# Patient Record
Sex: FEMALE | Race: OTHER | Employment: PART TIME
[De-identification: names, ages, dates, MRNs, and addresses within clinical notes are randomized per-mention and may not be internally consistent; named-entity substitution may affect disease eponyms.]

---

## 2024-09-19 ENCOUNTER — HOSPITAL ENCOUNTER (OUTPATIENT)
Facility: HOSPITAL | Age: 39
Setting detail: SPECIMEN
Discharge: HOME | End: 2024-09-22

## 2024-09-19 DIAGNOSIS — R42 DIZZINESS: ICD-10-CM

## 2024-09-19 PROCEDURE — 84443 ASSAY THYROID STIM HORMONE: CPT

## 2024-09-19 PROCEDURE — 80061 LIPID PANEL: CPT

## 2024-09-19 PROCEDURE — 82607 VITAMIN B-12: CPT

## 2024-09-19 PROCEDURE — 80053 COMPREHEN METABOLIC PANEL: CPT

## 2024-09-19 PROCEDURE — 83036 HEMOGLOBIN GLYCOSYLATED A1C: CPT

## 2024-09-19 PROCEDURE — 85027 COMPLETE CBC AUTOMATED: CPT

## 2024-09-19 PROCEDURE — 82746 ASSAY OF FOLIC ACID SERUM: CPT

## 2024-09-20 LAB
ALBUMIN SERPL-MCNC: 4.2 G/DL (ref 3.5–5)
ALBUMIN/GLOB SERPL: 1.2 (ref 1.1–2.2)
ALP SERPL-CCNC: 116 U/L (ref 45–117)
ALT SERPL-CCNC: 46 U/L (ref 12–78)
ANION GAP SERPL CALC-SCNC: 7 MMOL/L (ref 2–12)
AST SERPL-CCNC: 22 U/L (ref 15–37)
BILIRUB SERPL-MCNC: 0.6 MG/DL (ref 0.2–1)
BUN SERPL-MCNC: 10 MG/DL (ref 6–20)
BUN/CREAT SERPL: 12 (ref 12–20)
CALCIUM SERPL-MCNC: 9.4 MG/DL (ref 8.5–10.1)
CHLORIDE SERPL-SCNC: 102 MMOL/L (ref 97–108)
CHOLEST SERPL-MCNC: 179 MG/DL
CO2 SERPL-SCNC: 28 MMOL/L (ref 21–32)
CREAT SERPL-MCNC: 0.83 MG/DL (ref 0.55–1.02)
ERYTHROCYTE [DISTWIDTH] IN BLOOD BY AUTOMATED COUNT: 13.1 % (ref 11.5–14.5)
EST. AVERAGE GLUCOSE BLD GHB EST-MCNC: 117 MG/DL
FOLATE SERPL-MCNC: 23.1 NG/ML (ref 5–21)
GLOBULIN SER CALC-MCNC: 3.6 G/DL (ref 2–4)
GLUCOSE SERPL-MCNC: 143 MG/DL (ref 65–100)
HBA1C MFR BLD: 5.7 % (ref 4–5.6)
HCT VFR BLD AUTO: 47.2 % (ref 35–47)
HDLC SERPL-MCNC: 41 MG/DL
HDLC SERPL: 4.4 (ref 0–5)
HGB BLD-MCNC: 15.5 G/DL (ref 11.5–16)
LDLC SERPL CALC-MCNC: 113.8 MG/DL (ref 0–100)
MCH RBC QN AUTO: 29.2 PG (ref 26–34)
MCHC RBC AUTO-ENTMCNC: 32.8 G/DL (ref 30–36.5)
MCV RBC AUTO: 89.1 FL (ref 80–99)
NRBC # BLD: 0 K/UL (ref 0–0.01)
NRBC BLD-RTO: 0 PER 100 WBC
PLATELET # BLD AUTO: 280 K/UL (ref 150–400)
PMV BLD AUTO: 11.9 FL (ref 8.9–12.9)
POTASSIUM SERPL-SCNC: 3.6 MMOL/L (ref 3.5–5.1)
PROT SERPL-MCNC: 7.8 G/DL (ref 6.4–8.2)
RBC # BLD AUTO: 5.3 M/UL (ref 3.8–5.2)
SODIUM SERPL-SCNC: 137 MMOL/L (ref 136–145)
TRIGL SERPL-MCNC: 121 MG/DL
TSH SERPL DL<=0.05 MIU/L-ACNC: 1.36 UIU/ML (ref 0.36–3.74)
VIT B12 SERPL-MCNC: 667 PG/ML (ref 193–986)
VLDLC SERPL CALC-MCNC: 24.2 MG/DL
WBC # BLD AUTO: 8.8 K/UL (ref 3.6–11)

## 2024-09-30 ENCOUNTER — TELEMEDICINE (OUTPATIENT)
Age: 39
End: 2024-09-30

## 2024-09-30 DIAGNOSIS — R11.0 NAUSEA: Primary | ICD-10-CM

## 2024-09-30 DIAGNOSIS — R73.03 PREDIABETES: ICD-10-CM

## 2024-09-30 PROCEDURE — 99441 PR PHYS/QHP TELEPHONE EVALUATION 5-10 MIN: CPT | Performed by: FAMILY MEDICINE

## 2024-09-30 RX ORDER — PANTOPRAZOLE SODIUM 40 MG/1
40 TABLET, DELAYED RELEASE ORAL EVERY EVENING
Qty: 30 TABLET | Refills: 0 | Status: SHIPPED | OUTPATIENT
Start: 2024-09-30

## 2024-09-30 SDOH — ECONOMIC STABILITY: INCOME INSECURITY: HOW HARD IS IT FOR YOU TO PAY FOR THE VERY BASICS LIKE FOOD, HOUSING, MEDICAL CARE, AND HEATING?: NOT VERY HARD

## 2024-09-30 SDOH — ECONOMIC STABILITY: FOOD INSECURITY: WITHIN THE PAST 12 MONTHS, THE FOOD YOU BOUGHT JUST DIDN'T LAST AND YOU DIDN'T HAVE MONEY TO GET MORE.: NEVER TRUE

## 2024-09-30 SDOH — ECONOMIC STABILITY: FOOD INSECURITY: WITHIN THE PAST 12 MONTHS, YOU WORRIED THAT YOUR FOOD WOULD RUN OUT BEFORE YOU GOT MONEY TO BUY MORE.: NEVER TRUE

## 2024-09-30 ASSESSMENT — PATIENT HEALTH QUESTIONNAIRE - PHQ9
SUM OF ALL RESPONSES TO PHQ QUESTIONS 1-9: 0
SUM OF ALL RESPONSES TO PHQ9 QUESTIONS 1 & 2: 0
1. LITTLE INTEREST OR PLEASURE IN DOING THINGS: NOT AT ALL
2. FEELING DOWN, DEPRESSED OR HOPELESS: NOT AT ALL
SUM OF ALL RESPONSES TO PHQ QUESTIONS 1-9: 0

## 2024-09-30 NOTE — PROGRESS NOTES
Discharge call to the pt. The pt verified her name and . I reviewed the medication ordered today with the pt. The pt confirmed her Pharmacy and that she received the coupon for the medication. Melida Krishnan RN    
Intake call to the pt. AMN Int # [id number]. The pt verified her name and .   Pt reports she is taking a medication she bought herself. Magnesium 1 capsule a day. She does not know the mg.  Coordination of Care  1. Have you been to the ER, urgent care clinic since your last visit?  Hospitalized since your last visit? No    2. Have you seen or consulted any other health care providers outside of the [facility] since your last visit?  Include any pap smears or colon screening. No  Does the patient need refills?No    Learning Assessment Complete? no  Depression Screening complete in the past 12 months? yes    
located at home and provider was located in office or at home.     An electronic signature was used to authenticate this note.  -- Víctor Thibodeaux MD

## 2024-10-15 ENCOUNTER — OFFICE VISIT (OUTPATIENT)
Age: 39
End: 2024-10-15

## 2024-10-15 ENCOUNTER — HOSPITAL ENCOUNTER (OUTPATIENT)
Facility: HOSPITAL | Age: 39
Setting detail: SPECIMEN
Discharge: HOME | End: 2024-10-18

## 2024-10-15 VITALS
SYSTOLIC BLOOD PRESSURE: 140 MMHG | OXYGEN SATURATION: 95 % | HEART RATE: 75 BPM | DIASTOLIC BLOOD PRESSURE: 92 MMHG | BODY MASS INDEX: 32.37 KG/M2 | TEMPERATURE: 97.8 F | WEIGHT: 185 LBS

## 2024-10-15 DIAGNOSIS — Z01.419 ENCOUNTER FOR WELL WOMAN EXAM: Primary | ICD-10-CM

## 2024-10-15 DIAGNOSIS — Z98.51 HISTORY OF BILATERAL TUBAL LIGATION: ICD-10-CM

## 2024-10-15 DIAGNOSIS — Z23 NEEDS FLU SHOT: ICD-10-CM

## 2024-10-15 DIAGNOSIS — R03.0 ELEVATED BLOOD PRESSURE READING: ICD-10-CM

## 2024-10-15 PROCEDURE — 90656 IIV3 VACC NO PRSV 0.5 ML IM: CPT | Performed by: PHYSICIAN ASSISTANT

## 2024-10-15 PROCEDURE — 90471 IMMUNIZATION ADMIN: CPT | Performed by: PHYSICIAN ASSISTANT

## 2024-10-15 PROCEDURE — 88175 CYTOPATH C/V AUTO FLUID REDO: CPT

## 2024-10-15 PROCEDURE — 87624 HPV HI-RISK TYP POOLED RSLT: CPT

## 2024-10-15 PROCEDURE — 99395 PREV VISIT EST AGE 18-39: CPT | Performed by: PHYSICIAN ASSISTANT

## 2024-10-15 SDOH — HEALTH STABILITY: PHYSICAL HEALTH: ON AVERAGE, HOW MANY MINUTES DO YOU ENGAGE IN EXERCISE AT THIS LEVEL?: 0 MIN

## 2024-10-15 SDOH — HEALTH STABILITY: PHYSICAL HEALTH: ON AVERAGE, HOW MANY DAYS PER WEEK DO YOU ENGAGE IN MODERATE TO STRENUOUS EXERCISE (LIKE A BRISK WALK)?: 0 DAYS

## 2024-10-15 ASSESSMENT — PATIENT HEALTH QUESTIONNAIRE - PHQ9
2. FEELING DOWN, DEPRESSED OR HOPELESS: NOT AT ALL
SUM OF ALL RESPONSES TO PHQ9 QUESTIONS 1 & 2: 0
SUM OF ALL RESPONSES TO PHQ QUESTIONS 1-9: 0
1. LITTLE INTEREST OR PLEASURE IN DOING THINGS: NOT AT ALL
SUM OF ALL RESPONSES TO PHQ QUESTIONS 1-9: 0

## 2024-10-15 NOTE — PROGRESS NOTES
[facility]    When was your last pap smear and where? 12/2019    Any abnormal results from previous pap smears? no    Any problems with your breasts? no    Any family history of breast/ovarian cancer? no    Do you have any kids? yes    Oldest 22,14 youngest 12    Are you sexually active? yes    Are you using any type of birth control? If yes where do you go for this? no    Abuse screening completed this visit? yes

## 2024-10-15 NOTE — PROGRESS NOTES
Assessment/Plan:    Anila Marrufo was seen today for gynecologic exam.    Diagnoses and all orders for this visit:    Encounter for well woman exam    History of bilateral tubal ligation    Elevated blood pressure reading    Needs flu shot    EWL info for next year  Lifestyle changes for elevated blood pressure- has f/up appt scheduled for 10/25/24 to recheck   She has not made any lifestyle changes since her last appt. We discussed how this could help her pre diabetes and elevated bp    No follow-up provider specified.    Breanne Sahu PA-C  Anila Marrufo Calderon Coleman expressed understanding of this plan. An AVS was printed and given to the patient.      ----------------------------------------------------------------------    Chief Complaint   Patient presents with    Gynecologic Exam       History of Present Illness:  Pt presents for well woman visit  Last pap , no hx of abnl   . Had BTL after last delivery  , no risk for DV  Having regular monthly periods, not heavy   Mom and sister have HTN       No past medical history on file.    Current Outpatient Medications   Medication Sig Dispense Refill    pantoprazole (PROTONIX) 40 MG tablet Take 1 tablet by mouth every evening 30 tablet 0     No current facility-administered medications for this visit.       No Known Allergies    Social History     Tobacco Use    Smoking status: Never     Passive exposure: Never    Smokeless tobacco: Never   Substance Use Topics    Alcohol use: Never    Drug use: Never       No family history on file.    Physical Exam:     BP (!) 140/92 (Site: Left Upper Arm, Position: Sitting, Cuff Size: Large Adult)   Pulse 75   Temp 97.8 °F (36.6 °C) (Temporal)   Wt 83.9 kg (185 lb)   LMP 2024   SpO2 95%   BMI 32.37 kg/m²     A&Ox3  WDWN NAD  Respirations normal and non labored  Cor RRR s1s2  Lungs CTA Chema   Thyroid not enlarged  Pelvic exam- ext neg for lesion or discharge. Cervix and vagina neg for

## 2024-10-15 NOTE — PROGRESS NOTES
Anila thurstonNiru Calderon Coleman seen at d/c, full name and  verified. After Visit Summary provided and all discharge instructions reviewed. Provided patient with information pamphlet and phone number for [facility] for pap smear and mammogram exams. Patient was instructed to call and set up an appointment at her earliest convenience. Patient is aware that the program has its own financial screening and requirements.     Influenza vaccine requested by patient. [facility] Immunization Information System (VIIS) historical immunizations and consent reviewed. VIS given to patient and possible side effects explained, including those which would warrant emergent evaluation. Patient denies fever, allergies, and previous immunization reactions. Advised patient to wait 15 minutes after vaccine administration to monitor for adverse reactions, patient verbalizes understanding. Immunization administered by this RN per order and recorded in EMR and VIIS. No adverse reactions noted at time of discharge. Opportunity for questions provided, patient denies any questions.     Shannon Skelton RN

## 2024-10-17 LAB — HPV I/H RISK 1 DNA CVX QL PROBE+SIG AMP: NEGATIVE

## 2024-10-25 ENCOUNTER — OFFICE VISIT (OUTPATIENT)
Age: 39
End: 2024-10-25

## 2024-10-25 VITALS
SYSTOLIC BLOOD PRESSURE: 119 MMHG | TEMPERATURE: 98 F | WEIGHT: 184.3 LBS | DIASTOLIC BLOOD PRESSURE: 68 MMHG | BODY MASS INDEX: 32.25 KG/M2 | HEART RATE: 82 BPM | OXYGEN SATURATION: 100 %

## 2024-10-25 DIAGNOSIS — R11.0 NAUSEA: Primary | ICD-10-CM

## 2024-10-25 DIAGNOSIS — R73.03 PREDIABETES: ICD-10-CM

## 2024-10-25 PROCEDURE — 99213 OFFICE O/P EST LOW 20 MIN: CPT | Performed by: FAMILY MEDICINE

## 2024-10-25 ASSESSMENT — PATIENT HEALTH QUESTIONNAIRE - PHQ9
SUM OF ALL RESPONSES TO PHQ9 QUESTIONS 1 & 2: 0
2. FEELING DOWN, DEPRESSED OR HOPELESS: NOT AT ALL
SUM OF ALL RESPONSES TO PHQ QUESTIONS 1-9: 0
1. LITTLE INTEREST OR PLEASURE IN DOING THINGS: NOT AT ALL
SUM OF ALL RESPONSES TO PHQ QUESTIONS 1-9: 0

## 2024-10-25 ASSESSMENT — ENCOUNTER SYMPTOMS
TROUBLE SWALLOWING: 0
NAUSEA: 0
ABDOMINAL PAIN: 0

## 2024-10-25 NOTE — PROGRESS NOTES
Pt's name and  verified. AVS provided. Pt instructed to schedule virtual visit in 6-8 weeks and in clinic follow up in 6 months per provider. Pt verbalizes understanding.  Time allowed for questions, all questions answered. Chloé North RN

## 2024-10-25 NOTE — PROGRESS NOTES
Anila Bone (: 1985) is a 39 y.o. female, Established patient, here for evaluation of the following chief complaint(s):  Follow-up (BP f/u )       ASSESSMENT/PLAN:  1. Nausea  Resolved on Protonix.  Finish Protonix and will do VV in 1-2 months to see if sx recur.  Will get H.Pylori Ag if sx recur.  2. Prediabetes  Discussed diet.  Recheck in 6 months.    Return for follow up F2F in 6 months for prediabetes, VV 6-8 weeks for follow up on nausea (PM appt).    SUBJECTIVE:  Follow up on BP, nausea  Nausea/Dizziness: 4 months of AM, nausea and dizziness.  Usually in AM but occasionally during the day.  Has improved with Protonix.  Elevated BP:  she is being more careful with diet.    Shx:  no smoking, no alcohol.  Fhx:  Sis - DM    PMHx:  BTL    Review of Systems   HENT:  Negative for trouble swallowing.    Gastrointestinal:  Negative for abdominal pain and nausea.     OBJECTIVE:  Blood pressure 119/68, pulse 82, temperature 98 °F (36.7 °C), temperature source Temporal, weight 83.6 kg (184 lb 4.8 oz), last menstrual period 10/19/2024, SpO2 100%.  Physical Exam  CONSTITUTIONAL:  Well developed.  No apparent distress.  PSYCHIATRIC: Oriented to time, place, person & situation.  Appropriate mood and affect.  ABDOMEN:  Normal bowel sounds.  Abdomen soft, non tender.  No hepatosplenomegaly or masses.    No results found for any visits on 10/25/24.       An electronic signature was used to authenticate this note.  -- Víctor Thibodeaux MD

## 2025-05-23 ENCOUNTER — HOSPITAL ENCOUNTER (OUTPATIENT)
Facility: HOSPITAL | Age: 40
Setting detail: SPECIMEN
Discharge: HOME | End: 2025-05-26

## 2025-05-23 ENCOUNTER — OFFICE VISIT (OUTPATIENT)
Age: 40
End: 2025-05-23

## 2025-05-23 VITALS
SYSTOLIC BLOOD PRESSURE: 135 MMHG | HEART RATE: 69 BPM | TEMPERATURE: 97.9 F | BODY MASS INDEX: 31.32 KG/M2 | OXYGEN SATURATION: 99 % | WEIGHT: 179 LBS | DIASTOLIC BLOOD PRESSURE: 78 MMHG

## 2025-05-23 DIAGNOSIS — M77.11 LATERAL EPICONDYLITIS OF RIGHT ELBOW: ICD-10-CM

## 2025-05-23 DIAGNOSIS — R73.03 PREDIABETES: ICD-10-CM

## 2025-05-23 DIAGNOSIS — G56.03 CARPAL TUNNEL SYNDROME ON BOTH SIDES: ICD-10-CM

## 2025-05-23 DIAGNOSIS — R73.03 PREDIABETES: Primary | ICD-10-CM

## 2025-05-23 DIAGNOSIS — R11.0 NAUSEA: ICD-10-CM

## 2025-05-23 LAB
ALBUMIN SERPL-MCNC: 4.2 G/DL (ref 3.5–5)
ALBUMIN/GLOB SERPL: 1.2 (ref 1.1–2.2)
ALP SERPL-CCNC: 102 U/L (ref 45–117)
ALT SERPL-CCNC: 30 U/L (ref 12–78)
ANION GAP SERPL CALC-SCNC: 6 MMOL/L (ref 2–12)
AST SERPL-CCNC: 15 U/L (ref 15–37)
BILIRUB SERPL-MCNC: 0.8 MG/DL (ref 0.2–1)
BUN SERPL-MCNC: 11 MG/DL (ref 6–20)
BUN/CREAT SERPL: 13 (ref 12–20)
CALCIUM SERPL-MCNC: 9.2 MG/DL (ref 8.5–10.1)
CHLORIDE SERPL-SCNC: 104 MMOL/L (ref 97–108)
CO2 SERPL-SCNC: 27 MMOL/L (ref 21–32)
CREAT SERPL-MCNC: 0.84 MG/DL (ref 0.55–1.02)
GLOBULIN SER CALC-MCNC: 3.4 G/DL (ref 2–4)
GLUCOSE SERPL-MCNC: 102 MG/DL (ref 65–100)
POTASSIUM SERPL-SCNC: 3.9 MMOL/L (ref 3.5–5.1)
PROT SERPL-MCNC: 7.6 G/DL (ref 6.4–8.2)
SODIUM SERPL-SCNC: 137 MMOL/L (ref 136–145)

## 2025-05-23 PROCEDURE — 80053 COMPREHEN METABOLIC PANEL: CPT

## 2025-05-23 PROCEDURE — 99214 OFFICE O/P EST MOD 30 MIN: CPT | Performed by: FAMILY MEDICINE

## 2025-05-23 PROCEDURE — 83036 HEMOGLOBIN GLYCOSYLATED A1C: CPT

## 2025-05-23 PROCEDURE — 82306 VITAMIN D 25 HYDROXY: CPT

## 2025-05-23 SDOH — HEALTH STABILITY: MENTAL HEALTH: HOW MANY DRINKS CONTAINING ALCOHOL DO YOU HAVE ON A TYPICAL DAY WHEN YOU ARE DRINKING?: PATIENT DOES NOT DRINK

## 2025-05-23 SDOH — HEALTH STABILITY: MENTAL HEALTH: HOW OFTEN DO YOU HAVE A DRINK CONTAINING ALCOHOL?: NEVER

## 2025-05-23 SDOH — SOCIAL STABILITY: SOCIAL NETWORK: HOW OFTEN DO YOU ATTEND MEETINGS OF THE CLUBS OR ORGANIZATIONS YOU BELONG TO?: NEVER

## 2025-05-23 SDOH — SOCIAL STABILITY: SOCIAL NETWORK: IN A TYPICAL WEEK, HOW MANY TIMES DO YOU TALK ON THE PHONE WITH FAMILY, FRIENDS, OR NEIGHBORS?: THREE TIMES A WEEK

## 2025-05-23 SDOH — SOCIAL STABILITY: SOCIAL NETWORK: HOW OFTEN DO YOU GET TOGETHER WITH FRIENDS OR RELATIVES?: THREE TIMES A WEEK

## 2025-05-23 SDOH — ECONOMIC STABILITY: TRANSPORTATION INSECURITY: IN THE PAST 12 MONTHS, HAS LACK OF TRANSPORTATION KEPT YOU FROM MEDICAL APPOINTMENTS OR FROM GETTING MEDICATIONS?: NO

## 2025-05-23 SDOH — SOCIAL STABILITY: SOCIAL NETWORK
DO YOU BELONG TO ANY CLUBS OR ORGANIZATIONS SUCH AS CHURCH GROUPS, UNIONS, FRATERNAL OR ATHLETIC GROUPS, OR SCHOOL GROUPS?: NO

## 2025-05-23 SDOH — SOCIAL STABILITY: SOCIAL INSECURITY: ARE YOU MARRIED, WIDOWED, DIVORCED, SEPARATED, NEVER MARRIED, OR LIVING WITH A PARTNER?: MARRIED

## 2025-05-23 SDOH — SOCIAL STABILITY: SOCIAL NETWORK: HOW OFTEN DO YOU ATTEND CHURCH OR RELIGIOUS SERVICES?: MORE THAN 4 TIMES PER YEAR

## 2025-05-23 SDOH — ECONOMIC STABILITY: HOUSING INSECURITY: IN THE LAST 12 MONTHS, WAS THERE A TIME WHEN YOU WERE NOT ABLE TO PAY THE MORTGAGE OR RENT ON TIME?: NO

## 2025-05-23 ASSESSMENT — PATIENT HEALTH QUESTIONNAIRE - PHQ9
SUM OF ALL RESPONSES TO PHQ QUESTIONS 1-9: 1
1. LITTLE INTEREST OR PLEASURE IN DOING THINGS: NOT AT ALL
SUM OF ALL RESPONSES TO PHQ QUESTIONS 1-9: 1
2. FEELING DOWN, DEPRESSED OR HOPELESS: SEVERAL DAYS

## 2025-05-23 ASSESSMENT — ENCOUNTER SYMPTOMS
COUGH: 0
CHEST TIGHTNESS: 0
SHORTNESS OF BREATH: 0

## 2025-05-23 NOTE — PROGRESS NOTES
Anila Bone (: 1985) is a 39 y.o. female, Established patient, here for evaluation of the following chief complaint(s):  prediabetes ( 6 Month f/u)       ASSESSMENT/PLAN:  1. Prediabetes  Suspect this has improved since patient is losing weight with diet.  -     Comprehensive Metabolic Panel; Future  -     Hemoglobin A1C; Future  2. Nausea  Mild but recurrent, suggesting gastritis.  Check H.Pylori.  -     H. Pylori Antigen, Stool; Future  3. Lateral epicondylitis of right elbow  Use Voltaren Gel  -     Vitamin D 25 Hydroxy; Future  4. Carpal tunnel syndrome on both sides  Discussed causes and tx.  Start with Wrist splints at night.    Return for follow up F2F in 6 months for check up.    SUBJECTIVE:  Follow up for nausea, prediabetes  Prediabetes:  has tried smaller portions and has lost weight.  Nausea/Dizziness: 4 months of AM, nausea and dizziness.  Usually in AM but occasionally during the day.  Resolved with Protonix but since stopping it still occurs occasionally.  Elevated BP:  she is being more careful with diet and has lost weight.  B hands feels numb in AM:  Rt elbow pain also.  Works all day with hands.  Numbness resolves once she moves her hands in AM.  No weakness.    Shx:  no smoking, no alcohol.  Fhx:  Sis - DM    PMHx:  BTL    Review of Systems   Constitutional:  Negative for unexpected weight change.   Respiratory:  Negative for cough, chest tightness and shortness of breath.    Cardiovascular:  Negative for chest pain, palpitations and leg swelling.   Endocrine: Negative for polydipsia and polyuria.   Neurological:  Negative for light-headedness.     OBJECTIVE:  Blood pressure 135/78, pulse 69, temperature 97.9 °F (36.6 °C), temperature source Temporal, weight 81.2 kg (179 lb), last menstrual period 2025, SpO2 99%.  Physical Exam  CONSTITUTIONAL:  Well developed.  No apparent distress.  PSYCHIATRIC: Oriented to time, place, person & situation.  Appropriate mood

## 2025-05-23 NOTE — PROGRESS NOTES
Anila Niru Calderon Chenra was seen at discharge. Patient verified their full name and . After visit Summary provided and reviewed with patient. RN advised patient when provider recommends to return for follow-up visit in 6 months. RN reviewed the provider's instructions with the patient, including medication instructions. Patient verbalized her understanding and denies having any further questions at this time.   [employer] coupon  provided to patient for the prescribed medication(s). Patient instructed to present coupon  to pharmacy cashier before paying to receive their medication at a discounted price.   H. Pylori testing kit provided to patient. Patient was instructed to label tube with full name, , and date of specimen collection. Full instructions and contents of testing kit reviewed with patient. RN demonstrated how specimen should be packed. Patient was advised to return specimen as soon as possible to [facility]. Address to clinic provided to patient. Patient informed they should drop off specimen to clinic any time between Monday-Friday, 8:30 am - 3 pm. Patient verbalized understanding.  Due to language barrier, an  ID: [id number]  assisted during this encounter.   TOSHA FRANK RN

## 2025-05-24 LAB
25(OH)D3 SERPL-MCNC: 30.4 NG/ML (ref 30–100)
EST. AVERAGE GLUCOSE BLD GHB EST-MCNC: 117 MG/DL
HBA1C MFR BLD: 5.7 % (ref 4–5.6)

## 2025-05-27 ENCOUNTER — RESULTS FOLLOW-UP (OUTPATIENT)
Age: 40
End: 2025-05-27

## 2025-05-27 NOTE — RESULT ENCOUNTER NOTE
A1c is still 5.7 which is borderline but his blood sugar that day is much better.  Continue with current tx.  Vitamin D, kidney function, liver function are all normal.